# Patient Record
Sex: FEMALE | Race: WHITE | Employment: OTHER | URBAN - METROPOLITAN AREA
[De-identification: names, ages, dates, MRNs, and addresses within clinical notes are randomized per-mention and may not be internally consistent; named-entity substitution may affect disease eponyms.]

---

## 2017-12-15 ENCOUNTER — APPOINTMENT (OUTPATIENT)
Dept: GENERAL RADIOLOGY | Facility: HOSPITAL | Age: 77
DRG: 200 | End: 2017-12-15
Attending: INTERNAL MEDICINE
Payer: MEDICARE

## 2017-12-15 ENCOUNTER — APPOINTMENT (OUTPATIENT)
Dept: CV DIAGNOSTICS | Facility: HOSPITAL | Age: 77
DRG: 200 | End: 2017-12-15
Attending: EMERGENCY MEDICINE
Payer: MEDICARE

## 2017-12-15 ENCOUNTER — APPOINTMENT (OUTPATIENT)
Dept: GENERAL RADIOLOGY | Facility: HOSPITAL | Age: 77
DRG: 200 | End: 2017-12-15
Attending: EMERGENCY MEDICINE
Payer: MEDICARE

## 2017-12-15 ENCOUNTER — HOSPITAL ENCOUNTER (INPATIENT)
Facility: HOSPITAL | Age: 77
LOS: 2 days | Discharge: LEFT BEFORE DISCHARGE | DRG: 200 | End: 2017-12-17
Attending: EMERGENCY MEDICINE | Admitting: HOSPITALIST
Payer: MEDICARE

## 2017-12-15 DIAGNOSIS — J93.9 PNEUMOTHORAX, UNSPECIFIED TYPE: Primary | ICD-10-CM

## 2017-12-15 DIAGNOSIS — R77.8 ELEVATED TROPONIN: ICD-10-CM

## 2017-12-15 PROBLEM — R73.9 HYPERGLYCEMIA: Status: ACTIVE | Noted: 2017-12-15

## 2017-12-15 PROCEDURE — 99223 1ST HOSP IP/OBS HIGH 75: CPT | Performed by: HOSPITALIST

## 2017-12-15 PROCEDURE — 71010 XR CHEST AP PORTABLE  (CPT=71010): CPT | Performed by: EMERGENCY MEDICINE

## 2017-12-15 PROCEDURE — 0W9930Z DRAINAGE OF RIGHT PLEURAL CAVITY WITH DRAINAGE DEVICE, PERCUTANEOUS APPROACH: ICD-10-PCS | Performed by: EMERGENCY MEDICINE

## 2017-12-15 PROCEDURE — 71010 XR CHEST AP PORTABLE  (CPT=71010): CPT | Performed by: INTERNAL MEDICINE

## 2017-12-15 RX ORDER — RIBOFLAVIN (VITAMIN B2) 100 MG
100 TABLET ORAL DAILY
COMMUNITY
End: 2021-07-27

## 2017-12-15 RX ORDER — PYRIDOXINE HCL (VITAMIN B6) 50 MG
1 TABLET ORAL DAILY
COMMUNITY
End: 2021-07-27

## 2017-12-15 RX ORDER — ASPIRIN 81 MG/1
81 TABLET ORAL DAILY
COMMUNITY
End: 2021-07-27

## 2017-12-15 RX ORDER — IBUPROFEN 400 MG/1
200 TABLET ORAL EVERY 4 HOURS PRN
Status: DISCONTINUED | OUTPATIENT
Start: 2017-12-15 | End: 2017-12-17

## 2017-12-15 RX ORDER — IPRATROPIUM BROMIDE AND ALBUTEROL SULFATE 2.5; .5 MG/3ML; MG/3ML
3 SOLUTION RESPIRATORY (INHALATION) ONCE
Status: COMPLETED | OUTPATIENT
Start: 2017-12-15 | End: 2017-12-15

## 2017-12-15 RX ORDER — IPRATROPIUM BROMIDE AND ALBUTEROL SULFATE 2.5; .5 MG/3ML; MG/3ML
3 SOLUTION RESPIRATORY (INHALATION) ONCE
Status: DISCONTINUED | OUTPATIENT
Start: 2017-12-15 | End: 2017-12-15

## 2017-12-15 RX ORDER — HYDROMORPHONE HYDROCHLORIDE 1 MG/ML
1 INJECTION, SOLUTION INTRAMUSCULAR; INTRAVENOUS; SUBCUTANEOUS ONCE
Status: COMPLETED | OUTPATIENT
Start: 2017-12-15 | End: 2017-12-15

## 2017-12-15 RX ORDER — IBUPROFEN 400 MG/1
400 TABLET ORAL EVERY 4 HOURS PRN
Status: DISCONTINUED | OUTPATIENT
Start: 2017-12-15 | End: 2017-12-17

## 2017-12-15 RX ORDER — ASPIRIN 81 MG/1
324 TABLET, CHEWABLE ORAL ONCE
Status: DISCONTINUED | OUTPATIENT
Start: 2017-12-15 | End: 2017-12-15

## 2017-12-15 RX ORDER — NITROGLYCERIN 0.4 MG/1
0.4 TABLET SUBLINGUAL ONCE
Status: COMPLETED | OUTPATIENT
Start: 2017-12-15 | End: 2017-12-15

## 2017-12-15 RX ORDER — HYDROMORPHONE HYDROCHLORIDE 1 MG/ML
0.5 INJECTION, SOLUTION INTRAMUSCULAR; INTRAVENOUS; SUBCUTANEOUS EVERY 30 MIN PRN
Status: ACTIVE | OUTPATIENT
Start: 2017-12-15 | End: 2017-12-15

## 2017-12-15 RX ORDER — SODIUM CHLORIDE 9 MG/ML
INJECTION, SOLUTION INTRAVENOUS CONTINUOUS
Status: ACTIVE | OUTPATIENT
Start: 2017-12-15 | End: 2017-12-15

## 2017-12-15 RX ORDER — NICOTINE POLACRILEX 2 MG
1 GUM BUCCAL DAILY
COMMUNITY
End: 2021-07-27

## 2017-12-15 RX ORDER — ASPIRIN 81 MG/1
324 TABLET, CHEWABLE ORAL ONCE
Status: COMPLETED | OUTPATIENT
Start: 2017-12-15 | End: 2017-12-15

## 2017-12-15 RX ORDER — MORPHINE SULFATE 4 MG/ML
2 INJECTION, SOLUTION INTRAMUSCULAR; INTRAVENOUS EVERY 2 HOUR PRN
Status: DISCONTINUED | OUTPATIENT
Start: 2017-12-15 | End: 2017-12-17

## 2017-12-15 RX ORDER — NITROGLYCERIN 20 MG/100ML
INJECTION INTRAVENOUS
Status: DISCONTINUED | OUTPATIENT
Start: 2017-12-15 | End: 2017-12-17

## 2017-12-15 RX ORDER — ENOXAPARIN SODIUM 100 MG/ML
40 INJECTION SUBCUTANEOUS DAILY
Status: DISCONTINUED | OUTPATIENT
Start: 2017-12-15 | End: 2017-12-17

## 2017-12-15 RX ORDER — MORPHINE SULFATE 4 MG/ML
4 INJECTION, SOLUTION INTRAMUSCULAR; INTRAVENOUS EVERY 2 HOUR PRN
Status: DISCONTINUED | OUTPATIENT
Start: 2017-12-15 | End: 2017-12-17

## 2017-12-15 RX ORDER — ACETAMINOPHEN 500 MG
1000 TABLET ORAL EVERY 6 HOURS PRN
Status: DISCONTINUED | OUTPATIENT
Start: 2017-12-15 | End: 2017-12-17

## 2017-12-15 RX ORDER — ONDANSETRON 2 MG/ML
4 INJECTION INTRAMUSCULAR; INTRAVENOUS EVERY 4 HOURS PRN
Status: DISCONTINUED | OUTPATIENT
Start: 2017-12-15 | End: 2017-12-15

## 2017-12-15 RX ORDER — IBUPROFEN 600 MG/1
600 TABLET ORAL EVERY 4 HOURS PRN
Status: DISCONTINUED | OUTPATIENT
Start: 2017-12-15 | End: 2017-12-17

## 2017-12-15 RX ORDER — MORPHINE SULFATE 4 MG/ML
1 INJECTION, SOLUTION INTRAMUSCULAR; INTRAVENOUS EVERY 2 HOUR PRN
Status: DISCONTINUED | OUTPATIENT
Start: 2017-12-15 | End: 2017-12-17

## 2017-12-15 RX ORDER — ONDANSETRON 2 MG/ML
4 INJECTION INTRAMUSCULAR; INTRAVENOUS EVERY 4 HOURS PRN
Status: DISCONTINUED | OUTPATIENT
Start: 2017-12-15 | End: 2017-12-17

## 2017-12-15 RX ORDER — HYDROMORPHONE HYDROCHLORIDE 1 MG/ML
INJECTION, SOLUTION INTRAMUSCULAR; INTRAVENOUS; SUBCUTANEOUS
Status: DISPENSED
Start: 2017-12-15 | End: 2017-12-16

## 2017-12-15 NOTE — CONSULTS
[facility] at [address] [facility]  Report of Consultation    Wali Wilde Patient Status:  Emergency    1940 MRN [id number]   Location [facility] Attending Navid Baca MD   [facility] Day # 0 P Extremities: Without clubbing, cyanosis or edema. Peripheral pulses are 2+. Neurologic: Alert and oriented, normal affect. Skin: Warm and dry.      Laboratories and Data:  Diagnostics:  EKG: sinus tach, LBBB  CXR 6 cm right sided pneumothorax    Labs:

## 2017-12-15 NOTE — ED NOTES
Patient taking time to decide if she would like to stay and receive treatment or leave AMA at this time.

## 2017-12-15 NOTE — CONSULTS
Pulmonary Consult     Assessment / Plan:  1. Spontaneous PTX - s/p chest tube, now resolved  - currently with heimlich valve in place. Repeat CXR later tonight  - pain control  2.  History of tobacco use in long term remission - likely has COPD  - outpatien 12/15/17  1534   BP: 133/75 146/89 143/62 137/72   Pulse: 108 109 98 96   Resp: 21 19 20 20   SpO2: 93% 94% 98% 97%   Weight:       Height:           General: NAD  HEENT: OP clear  Respiratory: clear breath sounds bilaterally, right anterior chest tube in

## 2017-12-15 NOTE — ED NOTES
Patient requesting to leave. RN informed patient she would be leaving against medical advice. Patient is alert and oriented x 4 at this time. Patient able to verbalize that she had a pneumothorax or in her words \"collapsed lung\".  RN asks patient, \" Are

## 2017-12-15 NOTE — ED PROVIDER NOTES
Patient Seen in: [facility] Emergency Department    History   Patient presents with:  Chest Pain Angina (cardiovascular)    Stated Complaint: chest pain    HPI    19-year-old female complaining of chest pain this patient has not seen a doctor for 30 appears to have moderate dyspnea.   HEENT exam within normal limits neck is no lymphadenopathy or JVD lungs diminished breath sounds throughout cardiovascular exam shows regular rate and rhythm without murmurs abdomen is soft nontender extremities there is view results for these tests on the individual orders. RAINBOW DRAW BLUE   RAINBOW DRAW LAVENDER   RAINBOW DRAW LIGHT GREEN     EKG    Rate, intervals and axes as noted on EKG Report.   Rate: 109  Rhythm: Sinus tachycardia  Reading: Left bundle branch blo spontaneous pneumothorax. This involved direct patient intervention, complex decision making, and/or extensive discussions with the patient, family, and clinical staff.     Admission disposition: 12/15/2017  3:48 PM           Disposition and Plan     Clini

## 2017-12-15 NOTE — ED INITIAL ASSESSMENT (HPI)
Pt comes to ED c/o chest pain since 0800. Pt states the chest pain radiates to her back only when she lies down. Pt c/o pressure. Pt tachypneic.

## 2017-12-15 NOTE — H&P
[facility]  History and Physical     Melita Anne Patient Status:  Emergency    1940 MRN [id number]   Location [facility] Attending Tomasa Magana MD   Hosp Day # 0 PCP None Pcp     Chief Complaint: CP, SOB    Hi medications on file prior to encounter. No current outpatient prescriptions on file prior to encounter.   Physical Exam:    /72   Pulse 96   Resp 20   Ht 162.6 cm (5' 4\")   Wt 150 lb (68 kg)   SpO2 97%   BMI 25.75 kg/m²   General: Alert and oriente Prophylaxis: Lovenox   · CODE status: DNR  · Narayanan: No  Plan of care discussed with Patient     Yoli Gaona MD  12/15/2017

## 2017-12-15 NOTE — ED NOTES
Consent obtained for right sided chest tube placement at this time. Patient states that she would like to be a do not resuscitate. ED MD aware.

## 2017-12-16 ENCOUNTER — APPOINTMENT (OUTPATIENT)
Dept: GENERAL RADIOLOGY | Facility: HOSPITAL | Age: 77
DRG: 200 | End: 2017-12-16
Attending: INTERNAL MEDICINE
Payer: MEDICARE

## 2017-12-16 PROCEDURE — 71020 XR CHEST PA + LAT CHEST (CPT=71020): CPT | Performed by: INTERNAL MEDICINE

## 2017-12-16 PROCEDURE — 99232 SBSQ HOSP IP/OBS MODERATE 35: CPT | Performed by: HOSPITALIST

## 2017-12-16 NOTE — PROGRESS NOTES
· [facility] Progress Note     Subjective:  No pain or dyspnea - CT in place    Objective:  156/67  137/62  Afebrile   SR  I/o incomplete        Cardiac: S1 S2 regular  Lungs: cleart  Abdomen: soft  Extremities: no edema  Skin: warm/dry    Ass

## 2017-12-16 NOTE — PLAN OF CARE
Assumed care for patient at  on 12/16. A&O X4. VSS on RA. SR with BBB. . Left lung clear/diminished. Right lung diminished. Right upper chest tube clamped. No SOB. No pain. Lovenox for VTE prophylaxis. General diet. Continent of B&B.

## 2017-12-16 NOTE — PROGRESS NOTES
Pulmonary Progress Note        NAME: Phi Gregory - ROOM: 3450-F - MRN: [id number] - Age: 68year old - : 1940    Past Medical History:   Diagnosis Date   • H/O seasonal allergies    • Visual impairment          SUBJECTIVE: No new events overnig

## 2017-12-16 NOTE — PROGRESS NOTES
[facility]  Progress Note     Wali Wilde Patient Status:  Inpatient    1940 MRN [id number]   [facility] 2NE-A Attending Sherman Crocker MD   Hosp Day # 1 PCP None Pcp     Chief Complaint: SOB  S:  SOB and CP improving.  No fe PRN  4. Hyperglycemia-check A1c    Quality:  · DVT Prophylaxis: Lovenox  · CODE status: Full  Estimated date of discharge: ?tomorrow  Discharge is dependent on: Progress  At this point Ms. Elliott Goodson is expected to be discharge to: Lucien Weldon MD

## 2017-12-17 ENCOUNTER — APPOINTMENT (OUTPATIENT)
Dept: GENERAL RADIOLOGY | Facility: HOSPITAL | Age: 77
DRG: 200 | End: 2017-12-17
Attending: INTERNAL MEDICINE
Payer: MEDICARE

## 2017-12-17 ENCOUNTER — APPOINTMENT (OUTPATIENT)
Dept: GENERAL RADIOLOGY | Facility: HOSPITAL | Age: 77
DRG: 200 | End: 2017-12-17
Attending: HOSPITALIST
Payer: MEDICARE

## 2017-12-17 ENCOUNTER — APPOINTMENT (OUTPATIENT)
Dept: CV DIAGNOSTICS | Facility: HOSPITAL | Age: 77
DRG: 200 | End: 2017-12-17
Attending: HOSPITALIST
Payer: MEDICARE

## 2017-12-17 VITALS
TEMPERATURE: 98 F | BODY MASS INDEX: 25.6 KG/M2 | RESPIRATION RATE: 18 BRPM | HEART RATE: 77 BPM | DIASTOLIC BLOOD PRESSURE: 78 MMHG | WEIGHT: 149.94 LBS | SYSTOLIC BLOOD PRESSURE: 154 MMHG | OXYGEN SATURATION: 95 % | HEIGHT: 64 IN

## 2017-12-17 PROCEDURE — 71010 XR CHEST AP PORTABLE  (CPT=71010): CPT | Performed by: HOSPITALIST

## 2017-12-17 PROCEDURE — 99239 HOSP IP/OBS DSCHRG MGMT >30: CPT | Performed by: HOSPITALIST

## 2017-12-17 PROCEDURE — 93306 TTE W/DOPPLER COMPLETE: CPT | Performed by: HOSPITALIST

## 2017-12-17 PROCEDURE — 71010 XR CHEST AP PORTABLE  (CPT=71010): CPT | Performed by: INTERNAL MEDICINE

## 2017-12-17 RX ORDER — METHYLPREDNISOLONE SODIUM SUCCINATE 125 MG/2ML
80 INJECTION, POWDER, LYOPHILIZED, FOR SOLUTION INTRAMUSCULAR; INTRAVENOUS ONCE
Status: DISCONTINUED | OUTPATIENT
Start: 2017-12-17 | End: 2017-12-17

## 2017-12-17 RX ORDER — METHYLPREDNISOLONE SODIUM SUCCINATE 125 MG/2ML
60 INJECTION, POWDER, LYOPHILIZED, FOR SOLUTION INTRAMUSCULAR; INTRAVENOUS EVERY 6 HOURS
Status: DISCONTINUED | OUTPATIENT
Start: 2017-12-17 | End: 2017-12-17

## 2017-12-17 RX ORDER — HYDROMORPHONE HYDROCHLORIDE 1 MG/ML
0.5 INJECTION, SOLUTION INTRAMUSCULAR; INTRAVENOUS; SUBCUTANEOUS ONCE
Status: DISCONTINUED | OUTPATIENT
Start: 2017-12-17 | End: 2017-12-17

## 2017-12-17 NOTE — PROGRESS NOTES
· [facility] Progress Note     Subjective:  No pain or dyspnea - chest tube out    Objective:  154/78    Afebrile   SR  I/o incomplete    CXR no pneumothorax    Cardiac: S1 S2 regular  Lungs: cleart  Abdomen: soft  Extremities: no edema  Skin:

## 2017-12-17 NOTE — PROGRESS NOTES
Pulmonary Progress Note      NAME: Chantel Richardson - ROOM: [address] [place], [place] [address] - MRN: [id number] - Age: 68year old - : 1940    Assessment/Plan:  1.  Spontaneous PTX - s/p chest tube, now resolved on CXR  - Clamped overnight, still no pneumo  - Pulled chest tube expanded lung fields. No pneumo.

## 2017-12-17 NOTE — PLAN OF CARE
Problem: Patient/Family Goals  Goal: Patient/Family Long Term Goal  Patient's Long Term Goal: Discharge    Interventions:  -chest tube to pneumothorax  - See additional Care Plan goals for specific interventions    Outcome: Progressing    Goal: Patient/Fam PLANNING  Goal: Discharge to home or other facility with appropriate resources  INTERVENTIONS:  - Identify barriers to discharge w/pt and caregiver  - Include patient/family/discharge partner in discharge planning  - Arrange for needed discharge resources

## 2017-12-17 NOTE — PROGRESS NOTES
[facility]  Progress Note      Patient Status:  Inpatient    1940 MRN [id number]   [facility] 2NE-A Attending Shannan Romano MD   Hosp Day # 2 PCP None Pcp     Chief Complaint: SOB  S:  SOB and CP resolved.  No fev today  Shantal Rodarte MD

## 2017-12-17 NOTE — PLAN OF CARE
DX: SOB; Acute Pneumothorax - R upper chest tube clamped; Elevated Troponin - suspect  Demand ischemia secondary above- low suspicion for ACS    Data:  Pt lying in bed. Pt denies any CP, SOB, nausea or any other complaints.   VS: 163/70 HR 81 SR w/ BBB Rupali

## 2017-12-17 NOTE — PROGRESS NOTES
Patient had chest tube removed this AM. Follow up x-ray showed no pneumothorax. ECHO was performed. Patient was adamant since AM that she would leave by 1200. Patient did not want to wait for ECHO to be reviewed.  Patient wanted to leave against medical ad

## 2017-12-18 NOTE — DISCHARGE SUMMARY
[facility]  DISCHARGE SUMMARY     Wilber Forrester Patient Status:  Inpatient    1940 MRN [id number]   [facility] 2NE-A Attending No att. providers found   Hosp Day # 2 PCP None Pcp     Date of Admission: 12/15/2017  Date of Dischar palpitations. She states she has not seen a physician over 30 years. She has a history of smoking but quit 7 years ago. She denies fevers or chills or productive cough. She does not take any medications. She states she has seasonal allergies.   She den

## 2017-12-18 NOTE — PAYOR COMM NOTE
--------------  DISCHARGE REVIEW    Payor: [employer] MEDIC[employer] ADV PPO  Subscriber #:  [id number]  Authorization Number: [id number]     Admit date: 12/15/17  Admit time:  1700  Discharge Date: 12/17/2017  2:45 PM     Admitting Physician: Renetta Reddy MD  [place]

## 2017-12-18 NOTE — PAYOR COMM NOTE
--------------  ADMISSION REVIEW     Flori Enciso MEDICARE ADV PPO  Subscriber #:  [id number]  Authorization Number: [id number]     Admit date: 12/15/17  Admit time: 65       Admitting Physician: Teagan Goldstein MD  Attending Physician:  No att. providers Abnormal; Notable for the following:     RBC 5.19 (*)     Neutrophil Absolute Prelim 7.32 (*)     Neutrophil Absolute 7.32 (*)     All other components within normal limits   D-DIMER - Normal    Narrative:     FEU = Fibrinogen Equivalent Units.     D-Dimer COPD  1. Nebs, inhalers PRN[MD.1  4. Hyperglycemia-check A1c[MD.2]        MEDICATIONS ADMINISTERED IN LAST 1 DAY:  Dilaudid iv x 1  Nitro drip     PLAN: :Echo when pneumothorax has resolved. Will follow.  Spontaneous PTX - s/p chest tube, now resolved  - cu

## 2021-07-27 ENCOUNTER — APPOINTMENT (OUTPATIENT)
Dept: CT IMAGING | Facility: HOSPITAL | Age: 81
DRG: 191 | End: 2021-07-27
Attending: EMERGENCY MEDICINE
Payer: MEDICARE

## 2021-07-27 ENCOUNTER — HOSPITAL ENCOUNTER (INPATIENT)
Facility: HOSPITAL | Age: 81
LOS: 2 days | Discharge: LEFT BEFORE DISCHARGE | DRG: 191 | End: 2021-07-29
Attending: EMERGENCY MEDICINE | Admitting: INTERNAL MEDICINE
Payer: MEDICARE

## 2021-07-27 ENCOUNTER — APPOINTMENT (OUTPATIENT)
Dept: GENERAL RADIOLOGY | Facility: HOSPITAL | Age: 81
DRG: 191 | End: 2021-07-27
Attending: EMERGENCY MEDICINE
Payer: MEDICARE

## 2021-07-27 DIAGNOSIS — J44.1 COPD EXACERBATION (HCC): Primary | ICD-10-CM

## 2021-07-27 DIAGNOSIS — K92.2 GASTROINTESTINAL HEMORRHAGE, UNSPECIFIED GASTROINTESTINAL HEMORRHAGE TYPE: ICD-10-CM

## 2021-07-27 DIAGNOSIS — E87.6 HYPOKALEMIA: ICD-10-CM

## 2021-07-27 DIAGNOSIS — K63.89 COLONIC MASS: ICD-10-CM

## 2021-07-27 DIAGNOSIS — D64.9 ANEMIA, UNSPECIFIED TYPE: ICD-10-CM

## 2021-07-27 DIAGNOSIS — R09.02 HYPOXIA: ICD-10-CM

## 2021-07-27 DIAGNOSIS — E87.1 HYPONATREMIA: ICD-10-CM

## 2021-07-27 LAB
ALBUMIN SERPL-MCNC: 2.4 G/DL (ref 3.4–5)
ALBUMIN/GLOB SERPL: 0.6 {RATIO} (ref 1–2)
ALP LIVER SERPL-CCNC: 104 U/L
ALT SERPL-CCNC: 6 U/L
ANION GAP SERPL CALC-SCNC: 7 MMOL/L (ref 0–18)
ANTIBODY SCREEN: NEGATIVE
APTT PPP: 27.7 SECONDS (ref 25.4–36.1)
AST SERPL-CCNC: 10 U/L (ref 15–37)
BASOPHILS # BLD AUTO: 0.04 X10(3) UL (ref 0–0.2)
BASOPHILS NFR BLD AUTO: 0.2 %
BILIRUB SERPL-MCNC: 0.4 MG/DL (ref 0.1–2)
BUN BLD-MCNC: 6 MG/DL (ref 7–18)
BUN/CREAT SERPL: 14.6 (ref 10–20)
CALCIUM BLD-MCNC: 7.9 MG/DL (ref 8.5–10.1)
CHLORIDE SERPL-SCNC: 101 MMOL/L (ref 98–112)
CO2 SERPL-SCNC: 26 MMOL/L (ref 21–32)
CREAT BLD-MCNC: 0.41 MG/DL
D-DIMER: 1.01 UG/ML FEU (ref ?–0.81)
DEPRECATED RDW RBC AUTO: 43 FL (ref 35.1–46.3)
EOSINOPHIL # BLD AUTO: 0.02 X10(3) UL (ref 0–0.7)
EOSINOPHIL NFR BLD AUTO: 0.1 %
ERYTHROCYTE [DISTWIDTH] IN BLOOD BY AUTOMATED COUNT: 15 % (ref 11–15)
GLOBULIN PLAS-MCNC: 4 G/DL (ref 2.8–4.4)
GLUCOSE BLD-MCNC: 208 MG/DL (ref 70–99)
GLUCOSE BLD-MCNC: 208 MG/DL (ref 70–99)
HCT VFR BLD AUTO: 28.4 %
HGB BLD-MCNC: 8.6 G/DL
IMM GRANULOCYTES # BLD AUTO: 0.13 X10(3) UL (ref 0–1)
IMM GRANULOCYTES NFR BLD: 0.7 %
INR BLD: 1.11 (ref 0.89–1.11)
LYMPHOCYTES # BLD AUTO: 1.57 X10(3) UL (ref 1–4)
LYMPHOCYTES NFR BLD AUTO: 8.8 %
M PROTEIN MFR SERPL ELPH: 6.4 G/DL (ref 6.4–8.2)
MCH RBC QN AUTO: 23.9 PG (ref 26–34)
MCHC RBC AUTO-ENTMCNC: 30.3 G/DL (ref 31–37)
MCV RBC AUTO: 78.9 FL
MONOCYTES # BLD AUTO: 1.09 X10(3) UL (ref 0.1–1)
MONOCYTES NFR BLD AUTO: 6.1 %
NEUTROPHILS # BLD AUTO: 14.99 X10 (3) UL (ref 1.5–7.7)
NEUTROPHILS # BLD AUTO: 14.99 X10(3) UL (ref 1.5–7.7)
NEUTROPHILS NFR BLD AUTO: 84.1 %
OSMOLALITY SERPL CALC.SUM OF ELEC: 282 MOSM/KG (ref 275–295)
PLATELET # BLD AUTO: 519 10(3)UL (ref 150–450)
POTASSIUM SERPL-SCNC: 3.3 MMOL/L (ref 3.5–5.1)
PSA SERPL DL<=0.01 NG/ML-MCNC: 14.5 SECONDS (ref 12.2–14.5)
RBC # BLD AUTO: 3.6 X10(6)UL
RH BLOOD TYPE: POSITIVE
SARS-COV-2 RNA RESP QL NAA+PROBE: NOT DETECTED
SODIUM SERPL-SCNC: 134 MMOL/L (ref 136–145)
TROPONIN I SERPL-MCNC: <0.045 NG/ML (ref ?–0.04)
WBC # BLD AUTO: 17.8 X10(3) UL (ref 4–11)

## 2021-07-27 PROCEDURE — 99222 1ST HOSP IP/OBS MODERATE 55: CPT | Performed by: STUDENT IN AN ORGANIZED HEALTH CARE EDUCATION/TRAINING PROGRAM

## 2021-07-27 PROCEDURE — 71275 CT ANGIOGRAPHY CHEST: CPT | Performed by: EMERGENCY MEDICINE

## 2021-07-27 PROCEDURE — 74177 CT ABD & PELVIS W/CONTRAST: CPT | Performed by: EMERGENCY MEDICINE

## 2021-07-27 PROCEDURE — 71045 X-RAY EXAM CHEST 1 VIEW: CPT | Performed by: EMERGENCY MEDICINE

## 2021-07-27 RX ORDER — METOCLOPRAMIDE HYDROCHLORIDE 5 MG/ML
10 INJECTION INTRAMUSCULAR; INTRAVENOUS EVERY 8 HOURS PRN
Status: DISCONTINUED | OUTPATIENT
Start: 2021-07-27 | End: 2021-07-29

## 2021-07-27 RX ORDER — ONDANSETRON 2 MG/ML
4 INJECTION INTRAMUSCULAR; INTRAVENOUS EVERY 6 HOURS PRN
Status: DISCONTINUED | OUTPATIENT
Start: 2021-07-27 | End: 2021-07-29

## 2021-07-27 RX ORDER — HYDROMORPHONE HYDROCHLORIDE 1 MG/ML
0.5 INJECTION, SOLUTION INTRAMUSCULAR; INTRAVENOUS; SUBCUTANEOUS EVERY 30 MIN PRN
Status: ACTIVE | OUTPATIENT
Start: 2021-07-27 | End: 2021-07-28

## 2021-07-27 RX ORDER — BISACODYL 10 MG
10 SUPPOSITORY, RECTAL RECTAL
Status: DISCONTINUED | OUTPATIENT
Start: 2021-07-27 | End: 2021-07-29

## 2021-07-27 RX ORDER — ONDANSETRON 2 MG/ML
4 INJECTION INTRAMUSCULAR; INTRAVENOUS EVERY 4 HOURS PRN
Status: DISCONTINUED | OUTPATIENT
Start: 2021-07-27 | End: 2021-07-27

## 2021-07-27 RX ORDER — ACETAMINOPHEN 325 MG/1
650 TABLET ORAL EVERY 6 HOURS PRN
Status: DISCONTINUED | OUTPATIENT
Start: 2021-07-27 | End: 2021-07-29

## 2021-07-27 RX ORDER — SODIUM PHOSPHATE, DIBASIC AND SODIUM PHOSPHATE, MONOBASIC 7; 19 G/133ML; G/133ML
1 ENEMA RECTAL ONCE AS NEEDED
Status: DISCONTINUED | OUTPATIENT
Start: 2021-07-27 | End: 2021-07-29

## 2021-07-27 RX ORDER — POLYETHYLENE GLYCOL 3350 17 G/17G
17 POWDER, FOR SOLUTION ORAL DAILY PRN
Status: DISCONTINUED | OUTPATIENT
Start: 2021-07-27 | End: 2021-07-29

## 2021-07-27 RX ORDER — DOCUSATE SODIUM 100 MG/1
100 CAPSULE, LIQUID FILLED ORAL 2 TIMES DAILY
Status: DISCONTINUED | OUTPATIENT
Start: 2021-07-27 | End: 2021-07-29

## 2021-07-27 RX ORDER — IPRATROPIUM BROMIDE AND ALBUTEROL SULFATE 2.5; .5 MG/3ML; MG/3ML
3 SOLUTION RESPIRATORY (INHALATION)
Status: DISCONTINUED | OUTPATIENT
Start: 2021-07-28 | End: 2021-07-28

## 2021-07-27 RX ORDER — POTASSIUM CHLORIDE 20 MEQ/1
40 TABLET, EXTENDED RELEASE ORAL ONCE
Status: COMPLETED | OUTPATIENT
Start: 2021-07-27 | End: 2021-07-27

## 2021-07-28 ENCOUNTER — APPOINTMENT (OUTPATIENT)
Dept: CV DIAGNOSTICS | Facility: HOSPITAL | Age: 81
DRG: 191 | End: 2021-07-28
Attending: HOSPITALIST
Payer: MEDICARE

## 2021-07-28 LAB
ANION GAP SERPL CALC-SCNC: 6 MMOL/L (ref 0–18)
ATRIAL RATE: 118 BPM
BASOPHILS # BLD AUTO: 0.02 X10(3) UL (ref 0–0.2)
BASOPHILS NFR BLD AUTO: 0.1 %
BUN BLD-MCNC: 5 MG/DL (ref 7–18)
BUN/CREAT SERPL: 11.1 (ref 10–20)
CALCIUM BLD-MCNC: 8.4 MG/DL (ref 8.5–10.1)
CHLORIDE SERPL-SCNC: 102 MMOL/L (ref 98–112)
CO2 SERPL-SCNC: 27 MMOL/L (ref 21–32)
CREAT BLD-MCNC: 0.45 MG/DL
DEPRECATED HBV CORE AB SER IA-ACNC: 15.9 NG/ML
DEPRECATED RDW RBC AUTO: 41.6 FL (ref 35.1–46.3)
EOSINOPHIL # BLD AUTO: 0.01 X10(3) UL (ref 0–0.7)
EOSINOPHIL NFR BLD AUTO: 0.1 %
ERYTHROCYTE [DISTWIDTH] IN BLOOD BY AUTOMATED COUNT: 14.9 % (ref 11–15)
GLUCOSE BLD-MCNC: 118 MG/DL (ref 70–99)
HCT VFR BLD AUTO: 27.1 %
HGB BLD-MCNC: 8.1 G/DL
HGB BLD-MCNC: 8.1 G/DL
IMM GRANULOCYTES # BLD AUTO: 0.09 X10(3) UL (ref 0–1)
IMM GRANULOCYTES NFR BLD: 0.6 %
IRON SATURATION: 5 %
IRON SERPL-MCNC: 13 UG/DL
LYMPHOCYTES # BLD AUTO: 1.52 X10(3) UL (ref 1–4)
LYMPHOCYTES NFR BLD AUTO: 10.7 %
MCH RBC QN AUTO: 23.3 PG (ref 26–34)
MCHC RBC AUTO-ENTMCNC: 29.9 G/DL (ref 31–37)
MCV RBC AUTO: 77.9 FL
MONOCYTES # BLD AUTO: 0.98 X10(3) UL (ref 0.1–1)
MONOCYTES NFR BLD AUTO: 6.9 %
NEUTROPHILS # BLD AUTO: 11.52 X10 (3) UL (ref 1.5–7.7)
NEUTROPHILS # BLD AUTO: 11.52 X10(3) UL (ref 1.5–7.7)
NEUTROPHILS NFR BLD AUTO: 81.6 %
OSMOLALITY SERPL CALC.SUM OF ELEC: 278 MOSM/KG (ref 275–295)
P AXIS: 84 DEGREES
P-R INTERVAL: 150 MS
PLATELET # BLD AUTO: 390 10(3)UL (ref 150–450)
POTASSIUM SERPL-SCNC: 3.6 MMOL/L (ref 3.5–5.1)
PROCALCITONIN SERPL-MCNC: 0.07 NG/ML (ref ?–0.16)
Q-T INTERVAL: 374 MS
QRS DURATION: 128 MS
QTC CALCULATION (BEZET): 524 MS
R AXIS: 66 DEGREES
RBC # BLD AUTO: 3.48 X10(6)UL
SODIUM SERPL-SCNC: 135 MMOL/L (ref 136–145)
T AXIS: 96 DEGREES
TOTAL IRON BINDING CAPACITY: 271 UG/DL (ref 240–450)
TRANSFERRIN SERPL-MCNC: 182 MG/DL (ref 200–360)
TROPONIN I SERPL-MCNC: 0.4 NG/ML (ref ?–0.04)
TSI SER-ACNC: 1.42 MIU/ML (ref 0.36–3.74)
VENTRICULAR RATE: 118 BPM
WBC # BLD AUTO: 14.1 X10(3) UL (ref 4–11)

## 2021-07-28 PROCEDURE — 93306 TTE W/DOPPLER COMPLETE: CPT | Performed by: HOSPITALIST

## 2021-07-28 PROCEDURE — 99232 SBSQ HOSP IP/OBS MODERATE 35: CPT | Performed by: HOSPITALIST

## 2021-07-28 RX ORDER — IPRATROPIUM BROMIDE AND ALBUTEROL SULFATE 2.5; .5 MG/3ML; MG/3ML
3 SOLUTION RESPIRATORY (INHALATION) EVERY 6 HOURS PRN
Status: DISCONTINUED | OUTPATIENT
Start: 2021-07-28 | End: 2021-07-29

## 2021-07-28 NOTE — DIETARY NOTE
[facility]   CLINICAL NUTRITION    Rachel Chapa     Admitting diagnosis:  Hypokalemia [E87.6]  Hyponatremia [E87.1]  Hypoxia [R09.02]  COPD exacerbation ([address] [J44.1]  Colonic mass [K63.89]  Gastrointestinal hemorrhage, unspecified gastrointestinal he

## 2021-07-28 NOTE — PHYSICAL THERAPY NOTE
PHYSICAL THERAPY EVALUATION - INPATIENT     Room Number: 424/424-A  Evaluation Date: 7/28/2021  Type of Evaluation: Initial  Physician Order: PT Eval and Treat    Presenting Problem: dyspnea  Reason for Therapy: Mobility Dysfunction and Discharge Jeb decreased awareness of need for safety  · Awareness of Errors:  assistance required to correct errors made    [address]  Upper extremity ROM and strength- see OT eval    Lower extremity ROM is within functional limits     Bisi Ham primarily with turning. Pt given VC for pacing. Pt educated on benefit of use of RW for increased stability. Pt reports that she prefers to hold onto the walls and objects within her house. Discussed safety awareness.  Pt returned to sitting up in bedside c training;Strengthening;Stair training;Transfer training;Balance training  Rehab Potential : Good  Frequency (Obs): 3-5x/week  Number of Visits to Meet Established Goals: 3      CURRENT GOALS    Goal #1 Patient is able to demonstrate supine - sit EOB @ leve

## 2021-07-28 NOTE — H&P
[facility]  History and Physical     Manda Sykes Patient Status:  Emergency    1940 MRN [id number]   Location [facility] Attending Mariluz Cho MD   Hosp Day # 0 PCP Stephanie Suazo MD     Chief Complain carotid bruits. Respiratory: diminshed   Cardiovascular: S1, S2. Regular rate and rhythm. No murmurs, rubs or gallops. Equal pulses. Chest and Back: No tenderness or deformity. Abdomen: Soft, nontender, nondistended. Positive bowel sounds.  No rebound, discussed with pt, RN  Pt does not see Dr Judi Claude, A.- she has no PCP     Jannette Mckeon MD  7/27/2021

## 2021-07-28 NOTE — ED PROVIDER NOTES
Patient Seen in: [facility] Emergency Department      History   Patient presents with:  Altered Mental Status: h    Stated Complaint: hypoxia, AMS    HPI/Subjective:   HPI    80-year-old female presents to the emergency department stating that she j 07/27/21 1803 None (Room air)       Current:/79 (BP Location: Right arm)   Pulse 111   Temp 98 °F (36.7 °C) (Oral)   Resp 22   Wt 45.4 kg   SpO2 96%   BMI 17.16 kg/m²         Physical Exam  Vitals and nursing note reviewed.    Constitutional:       Ge Calcium, Total 7.9 (*)     AST 10 (*)     ALT 6 (*)     Albumin 2.4 (*)     A/G Ratio 0.6 (*)     All other components within normal limits   D-DIMER - Abnormal; Notable for the following components:    D-Dimer 1.01 (*)     All other components within norm were created for panel order Type and screen.   Procedure                               Abnormality         Status                     ---------                               -----------         ------                     ABORH (Blood ZA)[[id number]] lower lobe. There is no evidence of airspace disease or suspicious pulmonary nodule. There is subpleural nodular atelectasis involving the right upper lobe medially at the apex. MEDIASTINUM:  No adenopathy or mass. JOSÉ:  No mass or adenopathy.   CARDIAC dome of the diaphragm to the pubic symphysis with non-ionic intravenous contrast material. Post contrast coronal MPR imaging was performed. Dose reduction techniques were used.  Dose information is transmitted to the [facility] [employer] [facility] Radiology) NRD calculus. PELVIC NODES:  No adenopathy. PELVIC ORGANS:  No visible mass. Pelvic organs appropriate for patient age. BONES:  No bony lesion or fracture. LUNG BASES:  No visible pulmonary or pleural disease. OTHER:  Negative.              CONCLUSION: still feels short of breath. Overall her O2 saturation is good. We will swab her for COVID-19 as she has not been vaccinated and does not follow up with physicians. She is now answering questions appropriately.   Patient does feel that we should be \"mov Impression:  COPD exacerbation ([facility] [address]  (primary encounter diagnosis)  Hypoxia  Colonic mass  Gastrointestinal hemorrhage, unspecified gastrointestinal hemorrhage type  Anemia, unspecified type  Hyponatremia  Hypokalemia     Disposition:  Admit  7/27/2021  9

## 2021-07-28 NOTE — CM/SW NOTE
HARPREET called by neighbor of patient Cece Dennis who states the patient has no family and lives alone. Lisa Shah has some concerns stating that patient was very weak today and confused, that's why she called 911.  Lisa Shah also states that they live in a [facility]

## 2021-07-28 NOTE — CONSULTS
[facility]  Cardiology Consultation    Miah Gaitan Patient Status:  Inpatient    1940 MRN [id number]   [facility] 4NW-A Attending Мария Jackman [facility] Day # 1 PCP Addyosiris Molina MD     Reason for C suspension 30 mL, 30 mL, Oral, Daily PRN  •  bisacodyl (DULCOLAX) rectal suppository 10 mg, 10 mg, Rectal, Daily PRN  •  Fleet Enema (FLEET) 7-19 GM/118ML enema 133 mL, 1 enema, Rectal, Once PRN  •  predniSONE (DELTASONE) tab 60 mg, 60 mg, Oral, Daily with 07/27/2021    PTP 14.5 07/27/2021    TSH 1.420 07/28/2021    DDIMER 1.01 07/27/2021    TROP 0.398 07/28/2021    PGLU 208 07/27/2021       Imaging:  EKG 7/27/2021: sinus with LBBB; LBBB was seen on EKG 12/15/2017    CTA chest 7/27/2021: CONCLUSION:     1.  [facility] cardiac condition.   Patient will need to discuss with proceduralist weighing the risk/benefit before proceeding with any procedure.  -careful monitoring to avoid dramatic shifts of BP and fluid shifts intra-operatively and post-operatively will help minimi

## 2021-07-28 NOTE — BH PROGRESS NOTE
Went to see the pt for follow up with the phq4 score. Patient was informed of the reason for the consult. The pt said, she didn't answer any questions about anxiety or depression. Blossom Cox stated she will not answer no questions.   She went on to say she ca

## 2021-07-28 NOTE — PAYOR COMM NOTE
--------------  ADMISSION REVIEW     Alexis Sepulveda MA O  Subscriber #:  [id number]  Authorization Number: [id number]       ED Provider Notes        History   Patient presents with:  Altered Mental Status: h    Stated Complaint: hypoxia, AMS    HPI/Subjecti Physical Exam  Vitals and nursing note reviewed. Constitutional:       General: She is in acute distress. Appearance: Normal appearance. She is well-developed and normal weight.       Comments: Dyspneic, keeps eyes closed but will open eyes t following components:    D-Dimer 1.01 (*)     All other components within normal limits   IRON AND TIBC - Abnormal; Notable for the following components:    Iron 13 (*)     Transferrin 182 (*)     % Saturation 5 (*)     All other components within normal l HISTORY:(As transcribed by Technologist)  Patient presents with hypoxia   CONTRAST USED:  80cc of Omnipaque 350  FINDINGS:  VASCULATURE:  No visible pulmonary arterial thrombus or attenuation. THORACIC AORTA:  No aneurysm or visible dissection.   LUNGS:  T normal in size. The lungs are clear. The costophrenic angles are sharp. There is no active disease seen. CONCLUSION:  Hyperinflated lungs. Otherwise, no active disease seen.    Dictated by (CST): Harpal Taylor MD on 7/27/2021 at 7:10 PM colon into the surrounding fat and omentum. There is a prominent vessel within the posterior wall of the mass which could be due to tumor vascularity.   The possibility of a source of bleeding within the mass is also in the differential.  The overall size her previous admission from 2017 at that time she had a right pneumothorax. She had a work-up done that included an echocardiogram.  At that time she had a slightly elevated troponin that was also felt to be a demand issue.   It does not appear that radha Clinical Impression:  COPD exacerbation ([facility] [address]  (primary encounter diagnosis)  Hypoxia  Colonic mass  Gastrointestinal hemorrhage, unspecified gastrointestinal hemorrhage type  Anemia, unspecified type  Hyponatremia  Hypokalemia     Disposition:  Admit CA 7.9*   ALB 2.4*   *   K 3.3*      CO2 26.0   ALKPHO 104   AST 10*   ALT 6*   BILT 0.4   TP 6.4       CrCl cannot be calculated (Unknown ideal weight. ).     Recent Labs   Lab 07/27/21  1841   PTP 14.5   INR 1.11       COVID-19  Lab Results weighing the risk/benefit before proceeding with any procedure.  -careful monitoring to avoid dramatic shifts of BP and fluid shifts intra-operatively and post-operatively will help minimize cardiac complication        Component Value Date     WBC 14.1 07/ predniSONE (DELTASONE) tab 60 mg     Date Action Dose Route User    7/28/2021 1100 Given 60 mg Oral Aj Santana RN          REVIEWER COMMENTS:     OTHER:

## 2021-07-28 NOTE — PLAN OF CARE
NURSING ADMISSION NOTE      Patient admitted via Cart  Oriented to room. Safety precautions initiated. Bed in low position. Call light in reach. Assumed care at approx [phone]. Pt received alert and oriented x4.  SpO2 >92% on 3L O2 via NC, pt decline

## 2021-07-28 NOTE — PROGRESS NOTES
[facility]  Progress Note     Kapil Garcia Patient Status:  Inpatient    1940 MRN [id number]   [facility] 4NW-A Attending Brendon Noble [address] Day # 1 PCP Hannah Xie MD     Chief Complaint: SOB    S: Sahara Melvin Pro-Calcitonin  Recent Labs   Lab 07/27/21  1816   PCT 0.07       Cardiac  Recent Labs   Lab 07/27/21  1816 07/28/21  0625   TROP <0.045 0.398*       Creatinine Kinase  No results for input(s): CK in the last 168 hours.     Inflammatory Markers  Recen

## 2021-07-28 NOTE — CONSULTS
[address] [facility] Gastroenterology     Raphael Grimess Patient Status:  Inpatient    1940 MRN [id number]   [facility] 4NW-A Attending Mik Shoemaker [facility] Day # 1 PCP Isiah Chocorua PRN  magnesium hydroxide (MILK OF MAGNESIA) 400 MG/5ML suspension 30 mL, 30 mL, Oral, Daily PRN  bisacodyl (DULCOLAX) rectal suppository 10 mg, 10 mg, Rectal, Daily PRN  Fleet Enema (FLEET) 7-19 GM/118ML enema 133 mL, 1 enema, Rectal, Once PRN  predniSONE (PROTONIX) IV push  40 mg Intravenous Q12H   • docusate sodium  100 mg Oral BID   • predniSONE  60 mg Oral Daily with breakfast   • ipratropium-albuterol  3 mL Nebulization 4 times per day       Allergies:  No Known Allergies    Imaging:  I have personally  Pelvic organs appropriate for patient age.     BONES:  No bony lesion or fracture.     LUNG BASES:  No visible pulmonary or pleural disease.     OTHER:  Negative.                          Impression   CONCLUSION:         1.  Large fungating mass involving

## 2021-07-28 NOTE — PLAN OF CARE
A&Ox4. Forgetful intermittently. VSS. On 1L oxygen. Pt refused nebulizer treatments this AM. Awaiting echo to be completed. Possibly plan for flex sig tomorrow per GI if cardiology gives clearance. Pt stated she refuses any enemas prior to the procedure.  P

## 2021-07-28 NOTE — OCCUPATIONAL THERAPY NOTE
OCCUPATIONAL THERAPY EVALUATION - INPATIENT     Room Number: 424/424-A  Evaluation Date: 7/28/2021  Type of Evaluation: Initial  Presenting Problem: COPD exacerbation    Physician Order: IP Consult to Occupational Therapy  Reason for Therapy: ADL/IADL Dysf rate  Location: No pain reported       COGNITION  Overall Cognitive Status:  WFL - within functional limits    VISION  Current Vision: wears contacts    PERCEPTION  Overall Perception Status:   WFL - within functional limits    SENSATION  Light touch:  int in upright chair. In chair, pt completed lower body dressing task of don/doff socks.       Transfers  Supine to EOB: Mod I  Sit to Stand - EOB: Supervision    Functional Mobility: Min A with no assistive device for room and hallway ambulation    Balance  [address] options    Overall Complexity LOW     OT Discharge Recommendations: Home with home health PT/OT  OT Device Recommendations: TBD    PLAN  OT Treatment Plan: Balance activities; Energy conservation/work simplification techniques;ADL training;Continued evaluat

## 2021-07-28 NOTE — CONSULTS
[facility]  [id number]  Hospital Day #1  Date of Consult: 07/28/21  Patient seen at: [facility]    Reason for Consultation:      Consult requested by Dr. Harriette Gitelman for evaluation of palliati History:      Marital Status: Single  Children: unknown  Living Situation Prior to Hospitalization: per chart review, lives at home    Quaker/Cultural Information:  Quaker Affiliation: Wishes Privacy    Allergies: No Known Allergies    Medications: 1. No evidence of pulmonary embolus. 2. Mild to moderate emphysema of the upper lobes. 3. Bandlike nodular scarring along the right major fissure.    Dictated by (CST): Curtis Lynch MD on 7/27/2021 at 8:08 PM     Finalized by (CST): Curtis Lynch MD on Reduced Full or confused   30 Bedbound Extensive Disease  Can't do any work Max Assist  Total Care Reduced Drowsy/confused   20 Bedbound Extensive Disease  Can't do any work Max Assist  Total Care Minimal Drowsy/confused   10 Bedbound/coma Extensive Diseas

## 2021-07-29 VITALS
BODY MASS INDEX: 17 KG/M2 | WEIGHT: 100 LBS | SYSTOLIC BLOOD PRESSURE: 142 MMHG | HEART RATE: 93 BPM | RESPIRATION RATE: 19 BRPM | DIASTOLIC BLOOD PRESSURE: 66 MMHG | OXYGEN SATURATION: 100 % | TEMPERATURE: 98 F

## 2021-07-29 LAB
HGB BLD-MCNC: 8.3 G/DL
TROPONIN I SERPL-MCNC: 0.15 NG/ML (ref ?–0.04)

## 2021-07-29 PROCEDURE — 99239 HOSP IP/OBS DSCHRG MGMT >30: CPT | Performed by: HOSPITALIST

## 2021-07-29 NOTE — CM/SW NOTE
Reviewed Pt chart and noticed a  consult for [address] service. Attempted to see Pt regarding assessing for services and PT recommendation for [facility]. Pt was discharged home.  will remain available for support and/or discharge planning.

## 2021-07-29 NOTE — PAYOR COMM NOTE
--------------  CONTINUED STAY REVIEW    Stacey Burns MA [facility]  Subscriber #:  [id number]  Authorization Number: [id number]          7/29 GI    Patient still with large, brown stool.  Enemas ordered, d/w RN  Cards consulted, cardiac clearance - TTE impression Liz Nazario      predniSONE (DELTASONE) tab 60 mg     Date Action Dose Route User    7/29/2021 0849 Given 60 mg Oral Swapna Chambers RN    7/28/2021 1100 Given 60 mg Oral Aj Santana RN          Procedures:      Plan:

## 2021-07-29 NOTE — PROGRESS NOTES
Pt is refusing to stay in hospital, this writer trying to calm pt and talk with her and she is getting very angry.  Dr Cindi Dominguez was paged,

## 2021-07-29 NOTE — PROGRESS NOTES
GI Update Note    Patient still with large, brown stool. Enemas ordered, d/w RN  Cards consulted, cardiac clearance - TTE impression pending; Will plan for flex sig tomorrow, pending TTE.      Andre Michelle MD  [facility] Gastroenterology

## 2021-07-29 NOTE — IMAGING NOTE
Echocardiogram performed on 7/28/2021 read by Dr. Garland Dumont but not transferred as of yet from [address] [place] [address] to [facility]    Summary:  1. Normal LV size, LVEF 19-42%, grade 1 diastolic dysfunction  2.   Regional wall motion abnormalities: hypokinesis basal-mid a

## 2021-07-29 NOTE — DISCHARGE SUMMARY
[facility] HOSPITALIST  DISCHARGE SUMMARY     Gomez Michael Patient Status:  Inpatient    1940 MRN [id number]   [facility] 4NW-A Attending MARIANNA Pederson 21 Day # 2 PCP Charlestine Ormond, MD     Date of Admission:  °C)] 97.8 °F (36.6 °C)  Pulse:  [83-93] 93  Resp:  [19] 19  BP: (107-142)/(55-66) 142/66    Physical Exam:    General: No acute distress. Respiratory: Clear to auscultation bilaterally. No wheezes. No rhonchi.   Cardiovascular: S1, S2. Regular rate and rh

## 2021-07-29 NOTE — RESPIRATORY THERAPY NOTE
COPD EDUCATION ATTEMPTED PATIENT REFUSED AT THIS TIME    COPD/SMOKING CESSATION EDUCATION     Watched education videos related to dx          [] COPD       [] BREATHING TECHNIQUES    [] SMOKING CESSATION

## 2021-07-29 NOTE — PLAN OF CARE
Pt received alert and oriented x4. Vital signs stable, SpO2 >90% on 2L O2. Abdominal pain noted w/ palpation around location of mass. Pt agreeable to medications and labs this shift. Repeat troponin elevated 0.147, MD notified.  Up to bathroom with stand-by

## 2021-07-29 NOTE — PROGRESS NOTES
dR Fay AND THIS WRITER TRIED TO TALK WITH PT  BUT SHE STATES AUSTIN IS LEAVING AMA, HER NEIGHBOR WAS CALLED TO COME AND GET HER, IV DCD

## 2021-07-30 NOTE — PAYOR COMM NOTE
Discharge Notification    Patient Name: Sami Jacob: [facility]  Subscriber #: [id number]  Authorization Number: [id number]  Admit Date/Time: 7/27/2021 5:58 PM  Discharge Date/Time: 7/29/2021 2:11 PM

## (undated) NOTE — LETTER
Negrita Villegas 89  [address] [facility] [address], [address] [facility]  Authorization for Surgical Operation and Procedure     Date:___________                                                                                                         Time:__________ and allergic reactions, hemolytic reactions, transmission of diseases such as Hepatitis, AIDS and Cytomegalovirus (CMV) and fluid overload. In the event that I wish to have an autologous transfusion of my own blood, or a directed donor transfusion.   I jose recovery period ends for purposes of reinstating the DNAR order.   10. Patients having a sterilization procedure: I understand that if the procedure is successful the results will be permanent and it will therefore be impossible for me to inseminate, tasia additional procedures as necessary. Some examples are: Starting or using an “IV” to give me medicine, fluids or blood during my procedure, and having a breathing tube placed to help me breathe when I’m asleep (intubation).  In the event that my heart stops complications include headache, bleeding, infection, seizure, irregular heart rhythms, and nerve injury.     I can change my mind about having anesthesia services at any time before I get the medicine.    ____________________________________________________

## (undated) NOTE — LETTER
[facility] [address], [address] [facility]    Consent for Anesthesia   1.    IGomez He agree to be cared for by a physician anesthesiologist alone and/or with a nurse anesthetist, who is specially trained to monitor me and give me med allergic reactions to medications, injury to my airway, heart, lungs, vision, nerves, or muscles and in extremely rare instances death. 5. My doctor has explained to me other choices available to me for my care (alternatives).   6. Pregnant Patients (“epid Printed: 7/28/2021 at 1:37 PM    Medical Record #: [id number]                                            Page 1 of 1